# Patient Record
Sex: FEMALE | Race: OTHER | Employment: UNEMPLOYED | URBAN - METROPOLITAN AREA
[De-identification: names, ages, dates, MRNs, and addresses within clinical notes are randomized per-mention and may not be internally consistent; named-entity substitution may affect disease eponyms.]

---

## 2020-06-08 ENCOUNTER — OFFICE VISIT (OUTPATIENT)
Dept: PRIMARY CARE CLINIC | Age: 40
End: 2020-06-08

## 2020-06-08 DIAGNOSIS — R05.9 COUGH: Primary | ICD-10-CM

## 2020-06-08 NOTE — PROGRESS NOTES
Patient was seen in a Flu Clinic at [facility]. The patient verbally consented to being treated and billed for this visit    **  She is a 42 y.o. female who presents for evalution. Reviewed PmHx, RxHx, FmHx, SocHx, AllgHx and updated and dated in the chart. There are no active problems to display for this patient. Review of Systems - negative except as listed above in the HPI    Objective: There were no vitals filed for this visit. Physical Examination: General appearance - alert, well appearing, and in no distress      Assessment/ Plan:   Diagnoses and all orders for this visit:    1. Cough  -     NOVEL CORONAVIRUS (COVID-19); Future  -see scanned form  -exposed       Patient is presumed covid-19 positive based on criteria and testing performed and given mask today. See scanned note for history, vital details and testing results  There is no evidence of respiratory compromise  Discussed with patient to isolate for 14 days and gave [facility] handout on proper isolation   Gave work note if applicable  Discussed with patient to call 911 or go to ED with any inc respiratory sxs        I have discussed the diagnosis with the patient and the intended plan as seen in the above orders. The patient understands and agrees with the plan. The patient has received an after-visit summary and questions were answered concerning future plans. Medication Side Effects and Warnings were discussed with patient  Patient Labs were reviewed and or requested:  Patient Past Records were reviewed and or requested    Thong Berrios M.D. There are no Patient Instructions on file for this visit.

## 2020-06-10 LAB — SARS-COV-2, NAA: NOT DETECTED

## 2020-06-11 NOTE — PROGRESS NOTES
Attempted to contact patient. Haroon Bolden) Outgoing message states,\"unavailable\" There is No VM option.

## 2022-07-22 ENCOUNTER — HOSPITAL ENCOUNTER (EMERGENCY)
Age: 42
Discharge: HOME | End: 2022-07-22
Attending: EMERGENCY MEDICINE

## 2022-07-22 VITALS
OXYGEN SATURATION: 98 % | RESPIRATION RATE: 16 BRPM | HEIGHT: 63 IN | HEART RATE: 66 BPM | DIASTOLIC BLOOD PRESSURE: 81 MMHG | TEMPERATURE: 98.1 F | SYSTOLIC BLOOD PRESSURE: 117 MMHG | WEIGHT: 140 LBS | BODY MASS INDEX: 24.8 KG/M2

## 2022-07-22 DIAGNOSIS — L24.7 IRRITANT CONTACT DERMATITIS DUE TO PLANTS, EXCEPT FOOD: Primary | ICD-10-CM

## 2022-07-22 PROCEDURE — 99283 EMERGENCY DEPT VISIT LOW MDM: CPT

## 2022-07-22 RX ORDER — PREDNISONE 10 MG/1
TABLET ORAL
Qty: 60 TABLET | Refills: 0 | Status: SHIPPED | OUTPATIENT
Start: 2022-07-22 | End: 2022-08-11

## 2022-07-22 NOTE — DISCHARGE INSTRUCTIONS
Thank you for allowing us to provide you with medical care today. We realize that you have many choices for your emergency care needs. We thank you for choosing [facility]. Please choose us in the future for any continued health care needs. We hope we addressed all of your medical concerns. We strive to provide excellent quality care in the Emergency Department. Anything less than excellent does not meet our expectations. The exam and treatment you received in the Emergency Department were for an emergent problem and are not intended as complete care. It is important that you follow up with a doctor, nurse practitioner, or physician's assistant for ongoing care. If your symptoms worsen or you do not improve as expected and you are unable to reach your usual health care provider, you should return to the Emergency Department. We are available 24 hours a day. Take this sheet with you when you go to your follow-up visit. If you have any problem arranging the follow-up visit, contact the Emergency Department immediately. Make an appointment your family doctor for follow up of this visit. Return to the ER if you are unable to be seen in a timely manner.

## 2022-07-22 NOTE — ED PROVIDER NOTES
30-year-old female without any medical history presents to the emergency department complaint of a rash for about 1 week on her bilateral arms and abdomen. This occurred shortly after she was mowing the lawn. No fevers or chills. No injury. Rash is pruritic. The history is provided by the patient and medical records. Rash   This is a new problem. The current episode started more than 1 week ago. The problem has not changed since onset. The problem is associated with plant contact. There has been no fever. Affected Location: Bilateral forearms, anterior abdomen. The patient is experiencing no pain. Associated symptoms include itching. She has tried anti-itch cream for the symptoms. The treatment provided no relief. No past medical history on file. No past surgical history on file. No family history on file. Social History     Socioeconomic History    Marital status: Not on file     Spouse name: Not on file    Number of children: Not on file    Years of education: Not on file    Highest education level: Not on file   Occupational History    Not on file   Tobacco Use    Smoking status: Not on file    Smokeless tobacco: Not on file   Substance and Sexual Activity    Alcohol use: Not on file    Drug use: Not on file    Sexual activity: Not on file   Other Topics Concern    Not on file   Social History Narrative    Not on file     Social Determinants of Health     Financial Resource Strain: Not on file   Food Insecurity: Not on file   Transportation Needs: Not on file   Physical Activity: Not on file   Stress: Not on file   Social Connections: Not on file   Intimate Partner Violence: Not on file   Housing Stability: Not on file         ALLERGIES: Patient has no allergy information on record. Review of Systems   Constitutional:  Negative for fatigue and fever. HENT:  Negative for sneezing and sore throat. Respiratory:  Negative for cough and shortness of breath.     Cardiovascular:  Negative for chest pain and leg swelling. Gastrointestinal:  Negative for abdominal pain, diarrhea, nausea and vomiting. Genitourinary:  Negative for difficulty urinating and dysuria. Musculoskeletal:  Negative for arthralgias and myalgias. Skin:  Positive for itching and rash. Negative for color change. Neurological:  Negative for weakness and headaches. Psychiatric/Behavioral:  Negative for agitation and behavioral problems. Vitals:    07/22/22 1234   BP: 117/81   Pulse: 66   Resp: 16   Temp: 98.1 °F (36.7 °C)   SpO2: 98%   Weight: 63.5 kg (140 lb)   Height: 5' 3\" (1.6 m)            Physical Exam  Vitals and nursing note reviewed. Constitutional:       General: She is not in acute distress. Appearance: Normal appearance. She is well-developed. She is not ill-appearing, toxic-appearing or diaphoretic. HENT:      Head: Normocephalic and atraumatic. Nose: Nose normal.      Mouth/Throat:      Mouth: Mucous membranes are moist.      Pharynx: Oropharynx is clear. Eyes:      Extraocular Movements: Extraocular movements intact. Conjunctiva/sclera: Conjunctivae normal.      Pupils: Pupils are equal, round, and reactive to light. Cardiovascular:      Rate and Rhythm: Normal rate and regular rhythm. Pulses: Normal pulses. Heart sounds: Normal heart sounds. Pulmonary:      Effort: Pulmonary effort is normal. No respiratory distress. Breath sounds: Normal breath sounds. No wheezing. Chest:      Chest wall: No tenderness. Abdominal:      General: Abdomen is flat. There is no distension. Palpations: Abdomen is soft. Tenderness: There is no abdominal tenderness. There is no guarding or rebound. Musculoskeletal:         General: No swelling, tenderness, deformity or signs of injury. Normal range of motion. Cervical back: Normal range of motion and neck supple. No rigidity. No muscular tenderness. Right lower leg: No edema. Left lower leg: No edema. Skin:     General: Skin is warm and dry. Capillary Refill: Capillary refill takes less than 2 seconds. Findings: Rash (Scattered areas of irritation across the bilateral forearms and anterior lower abdomen) present. Neurological:      General: No focal deficit present. Mental Status: She is alert and oriented to person, place, and time. Psychiatric:         Mood and Affect: Mood normal.         Behavior: Behavior normal.        MDM  Number of Diagnoses or Management Options  Diagnosis management comments: 60-year-old female presents as above with rash consistent with contact dermatitis, likely poison ivy or related entity. Plan to treat with steroids, follow-up primary care, return needed.            Procedures

## 2022-07-22 NOTE — ED TRIAGE NOTES
Pt to ER with c/o rash to neyda arms, stomach, and neyda legs since last Saturday when she was mowing weeds. Pt reports using soap and water with no relief.

## 2023-09-11 ENCOUNTER — HOSPITAL ENCOUNTER (OUTPATIENT)
Facility: HOSPITAL | Age: 43
Discharge: HOME | End: 2023-09-14

## 2023-09-11 DIAGNOSIS — N94.6 DYSMENORRHEA: ICD-10-CM

## 2023-09-11 PROCEDURE — 76830 TRANSVAGINAL US NON-OB: CPT

## 2023-09-11 PROCEDURE — 76856 US EXAM PELVIC COMPLETE: CPT

## 2023-12-05 ENCOUNTER — TELEPHONE (OUTPATIENT)
Age: 43
End: 2023-12-05

## 2023-12-05 NOTE — TELEPHONE ENCOUNTER
Called patient to remind of upcoming appointment with [facility] on 12/12/23- no answer. Left message with appt. Details and [facility] hotline in case that she wants to cancel/reschedule.  Mehran Ramos

## 2023-12-12 ENCOUNTER — HOSPITAL ENCOUNTER (OUTPATIENT)
Facility: HOSPITAL | Age: 43
Discharge: HOME | End: 2023-12-15

## 2023-12-12 ENCOUNTER — OFFICE VISIT (OUTPATIENT)
Age: 43
End: 2023-12-12

## 2023-12-12 VITALS — WEIGHT: 145 LBS | HEIGHT: 63 IN | BODY MASS INDEX: 25.69 KG/M2

## 2023-12-12 DIAGNOSIS — Z01.419 ENCOUNTER FOR WELL WOMAN EXAM: Primary | ICD-10-CM

## 2023-12-12 DIAGNOSIS — Z12.31 VISIT FOR SCREENING MAMMOGRAM: ICD-10-CM

## 2023-12-12 PROCEDURE — 77063 BREAST TOMOSYNTHESIS BI: CPT

## 2024-01-15 ENCOUNTER — HOSPITAL ENCOUNTER (OUTPATIENT)
Facility: HOSPITAL | Age: 44
Discharge: HOME | End: 2024-01-18

## 2024-01-15 DIAGNOSIS — R92.8 ABNORMAL MAMMOGRAM OF LEFT BREAST: Primary | ICD-10-CM

## 2024-01-15 DIAGNOSIS — R92.8 ABNORMAL MAMMOGRAM: ICD-10-CM

## 2024-01-15 PROCEDURE — G0279 TOMOSYNTHESIS, MAMMO: HCPCS

## 2024-01-15 PROCEDURE — 76642 ULTRASOUND BREAST LIMITED: CPT

## 2024-01-26 ENCOUNTER — HOSPITAL ENCOUNTER (OUTPATIENT)
Facility: HOSPITAL | Age: 44
End: 2024-01-26

## 2024-01-26 DIAGNOSIS — N63.22 MASS OF UPPER INNER QUADRANT OF LEFT BREAST: ICD-10-CM

## 2024-01-26 PROCEDURE — 88341 IMHCHEM/IMCYTCHM EA ADD ANTB: CPT

## 2024-01-26 PROCEDURE — 77065 DX MAMMO INCL CAD UNI: CPT

## 2024-01-26 PROCEDURE — 88305 TISSUE EXAM BY PATHOLOGIST: CPT

## 2024-01-26 PROCEDURE — 19083 BX BREAST 1ST LESION US IMAG: CPT

## 2024-01-26 PROCEDURE — 88342 IMHCHEM/IMCYTCHM 1ST ANTB: CPT

## 2024-01-26 NOTE — PROGRESS NOTES
Patient tolerated procedure well.  Minimal bleeding noted.  Pressure held to biopsy site for 5 minutes.  Patient sent for post biopsy mammogram.  Dressing remained dry and intact.  Ice pack applied over transparent dressing.  Post biopsy discharge instructions reviewed with patient.  Patient expects to be contacted by phone using  services or [facility]  with pathology results.

## 2024-01-26 NOTE — PROGRESS NOTES
Patient received for left breast ultrasound guided biopsy for area seen on prior ultrasound.  Procedure explained to patient with assistance of language services.  Post biopsy discharge instructions reviewed with patient and provided to patient in [employer] text.  Patient states she is able to read discharge instructions.

## 2024-01-31 NOTE — PROGRESS NOTES
Pathology results in and reviewed by Dr Garcia and faxed to Taya Wood referring PA.  Unable to reach patient by phone due to no answer and no voicemail set up.  I will try again later.

## 2024-02-01 NOTE — PROGRESS NOTES
Patient came in to day for me to remove steri strips as patient did not feel comfortable removing them.  Patient complained of itchy skin left breast and under left breast.   Biopsy site healed without bleeding oozing or swelling.  Rash noted outside of biopsy site and under patient breast.  Patient states it is less itchy today.  Patient has been using calamine lotion over the rash.  Patient instructed to see her physician if rash worsens.  Patient states it is better today.  Patient states she understands pathology results and recommendation of yearly screening mammogram.  Patient questioned if staples were in her breast.  Explained to patient that no staples were used.

## 2024-02-28 ENCOUNTER — TELEPHONE (OUTPATIENT)
Age: 44
End: 2024-02-28

## 2024-02-28 NOTE — TELEPHONE ENCOUNTER
Noted that patient may receive or had received  a statement for breast biopsy performed at  on 1/26/2024 this charge should be transferred to the [facility] program. I will be sending a  message to [facility] to request the transfer of charges.pt is not responsible for charges of procedure. Hansa Parikh RN

## 2025-01-02 ENCOUNTER — TRANSCRIBE ORDERS (OUTPATIENT)
Facility: HOSPITAL | Age: 45
End: 2025-01-02

## 2025-01-02 DIAGNOSIS — Z12.31 VISIT FOR SCREENING MAMMOGRAM: Primary | ICD-10-CM

## 2025-01-27 ENCOUNTER — HOSPITAL ENCOUNTER (OUTPATIENT)
Facility: HOSPITAL | Age: 45
Discharge: HOME | End: 2025-01-30

## 2025-01-27 VITALS — WEIGHT: 145 LBS | BODY MASS INDEX: 25.69 KG/M2 | HEIGHT: 63 IN

## 2025-01-27 DIAGNOSIS — Z12.31 VISIT FOR SCREENING MAMMOGRAM: ICD-10-CM

## 2025-01-27 PROCEDURE — 77067 SCR MAMMO BI INCL CAD: CPT
